# Patient Record
Sex: FEMALE | Race: BLACK OR AFRICAN AMERICAN | URBAN - METROPOLITAN AREA
[De-identification: names, ages, dates, MRNs, and addresses within clinical notes are randomized per-mention and may not be internally consistent; named-entity substitution may affect disease eponyms.]

---

## 2018-09-17 ENCOUNTER — TELEPHONE (OUTPATIENT)
Dept: OBGYN CLINIC | Age: 53
End: 2018-09-17

## 2018-09-17 ENCOUNTER — OFFICE VISIT (OUTPATIENT)
Dept: OBGYN CLINIC | Age: 53
End: 2018-09-17

## 2018-09-17 VITALS
WEIGHT: 176 LBS | HEIGHT: 65 IN | BODY MASS INDEX: 29.32 KG/M2 | HEART RATE: 62 BPM | DIASTOLIC BLOOD PRESSURE: 74 MMHG | SYSTOLIC BLOOD PRESSURE: 129 MMHG | RESPIRATION RATE: 18 BRPM

## 2018-09-17 DIAGNOSIS — Z01.411 ENCOUNTER FOR GYNECOLOGICAL EXAMINATION WITH ABNORMAL FINDING: Primary | ICD-10-CM

## 2018-09-17 DIAGNOSIS — N89.8 VAGINAL DISCHARGE: ICD-10-CM

## 2018-09-17 LAB — WET MOUNT POCT, WMPOCT: NORMAL

## 2018-09-17 RX ORDER — HYDROCODONE BITARTRATE AND ACETAMINOPHEN 7.5; 325 MG/1; MG/1
2 TABLET ORAL
COMMUNITY

## 2018-09-17 RX ORDER — ALPRAZOLAM 1 MG/1
1 TABLET ORAL
COMMUNITY

## 2018-09-17 RX ORDER — PRAZOSIN HYDROCHLORIDE 1 MG/1
15 CAPSULE ORAL
COMMUNITY

## 2018-09-17 RX ORDER — METRONIDAZOLE 7.5 MG/G
GEL VAGINAL
Qty: 187.5 MG | Refills: 3 | Status: SHIPPED | OUTPATIENT
Start: 2018-09-17

## 2018-09-17 RX ORDER — SERTRALINE HYDROCHLORIDE 100 MG/1
100 TABLET, FILM COATED ORAL DAILY
COMMUNITY

## 2018-09-17 RX ORDER — METRONIDAZOLE 500 MG/1
500 TABLET ORAL 2 TIMES DAILY
Qty: 14 TAB | Refills: 0 | Status: SHIPPED | OUTPATIENT
Start: 2018-09-17 | End: 2018-09-24

## 2018-09-17 RX ORDER — HYDROCHLOROTHIAZIDE 25 MG/1
25 TABLET ORAL 2 TIMES DAILY
COMMUNITY

## 2018-09-17 NOTE — PROGRESS NOTES
Subjective:   50 y.o. female for Well Woman Check. Patient's last menstrual period was 04/17/2018. Social History: single partner, contraception - none. Pertinent past medical hstory: hypertension, no history of DVT, CAD, DM, liver disease, migraines or smoking. Current Outpatient Prescriptions   Medication Sig Dispense Refill    hydroCHLOROthiazide (HYDRODIURIL) 25 mg tablet Take 25 mg by mouth two (2) times a day. Indications: hypertension      prazosin (MINIPRESS) 1 mg capsule Take 15 mg by mouth once as needed. Indications: hypertension      HYDROcodone-acetaminophen (NORCO) 7.5-325 mg per tablet Take 2 Tabs by mouth every six (6) hours as needed for Pain. Indications: Pain, nerve pain      sertraline (ZOLOFT) 100 mg tablet Take 100 mg by mouth daily. Indications: ANXIETY WITH DEPRESSION      ALPRAZolam (XANAX) 1 mg tablet Take 1 mg by mouth three (3) times daily as needed for Anxiety. Allergies   Allergen Reactions    Pcn [Penicillins] Other (comments)     Past Medical History:   Diagnosis Date    Anemia     Chronic pain     Depression     Fibroid, uterine     Hypertension      Past Surgical History:   Procedure Laterality Date    HX DILATION AND CURETTAGE  1994    ectopic preg     Family History   Problem Relation Age of Onset    Hypertension Mother     No Known Problems Father     No Known Problems Maternal Grandmother     No Known Problems Maternal Grandfather     No Known Problems Paternal Grandmother     No Known Problems Paternal Grandfather      Social History   Substance Use Topics    Smoking status: Current Some Day Smoker     Types: Cigarettes    Smokeless tobacco: Never Used    Alcohol use Yes      Comment: occassionally        ROS:  Feeling well. No dyspnea or chest pain on exertion. No abdominal pain, change in bowel habits, black or bloody stools. No urinary tract symptoms.  GYN ROS: no breast pain or new or enlarging lumps on self exam, no pelvic pain, no hot flashes, she complains of an absence of menses for the past 8 months and of an abnormal vaginal discharge with odor. No neurological complaints. Objective:     Visit Vitals    /74    Pulse 62    Resp 18    Ht 5' 5\" (1.651 m)    Wt 176 lb (79.8 kg)    LMP 04/17/2018    BMI 29.29 kg/m2     The patient appears well, alert, oriented x 3, in no distress. ENT normal.  Neck supple. No adenopathy or thyromegaly. ROME. Lungs are clear, good air entry, no wheezes, rhonchi or rales. S1 and S2 normal, no murmurs, regular rate and rhythm. Abdomen soft without tenderness, guarding, mass or organomegaly. Extremities show no edema, normal peripheral pulses. Neurological is normal, no focal findings. BREAST EXAM: breasts appear normal, no suspicious masses, no skin or nipple changes or axillary nodes    PELVIC EXAM: normal external genitalia, vulva, vagina, cervix, uterus and adnexa, PAP: Pap smear done today, HPV test    Wet prep: positive clue cells, no yeast, no trich, minimal WBCs    Assessment/Plan:   well woman  BV, recurrent. Rx PO Flagyl and Metrogel for weekly prophylaxis  mammogram  pap smear  return annually or prn  Plan of care discussed. Patient expressed understanding.

## 2018-09-18 LAB — PAP IMAGE GUIDED, 8900296: NORMAL

## 2018-09-21 LAB — HPV HIGH RISK, 507303: NEGATIVE
